# Patient Record
Sex: MALE | Race: WHITE | NOT HISPANIC OR LATINO | URBAN - METROPOLITAN AREA
[De-identification: names, ages, dates, MRNs, and addresses within clinical notes are randomized per-mention and may not be internally consistent; named-entity substitution may affect disease eponyms.]

---

## 2021-07-28 ENCOUNTER — TELEPHONE ENCOUNTER (OUTPATIENT)
Dept: URBAN - METROPOLITAN AREA CLINIC 52 | Facility: CLINIC | Age: 56
End: 2021-07-28

## 2021-09-02 ENCOUNTER — WEB ENCOUNTER (OUTPATIENT)
Dept: URBAN - METROPOLITAN AREA CLINIC 94 | Facility: CLINIC | Age: 56
End: 2021-09-02

## 2021-09-02 ENCOUNTER — OFFICE VISIT (OUTPATIENT)
Dept: URBAN - METROPOLITAN AREA CLINIC 94 | Facility: CLINIC | Age: 56
End: 2021-09-02
Payer: COMMERCIAL

## 2021-09-02 VITALS
HEIGHT: 73 IN | WEIGHT: 249 LBS | DIASTOLIC BLOOD PRESSURE: 74 MMHG | BODY MASS INDEX: 33 KG/M2 | TEMPERATURE: 97.3 F | SYSTOLIC BLOOD PRESSURE: 120 MMHG | HEART RATE: 76 BPM

## 2021-09-02 DIAGNOSIS — R19.7 DIARRHEA, UNSPECIFIED TYPE: ICD-10-CM

## 2021-09-02 DIAGNOSIS — I85.10 SECONDARY ESOPHAGEAL VARICES WITHOUT BLEEDING: ICD-10-CM

## 2021-09-02 DIAGNOSIS — K70.31 ALCOHOLIC CIRRHOSIS OF LIVER WITH ASCITES: ICD-10-CM

## 2021-09-02 PROBLEM — 420054005: Status: ACTIVE | Noted: 2021-09-02

## 2021-09-02 PROBLEM — 14223005: Status: ACTIVE | Noted: 2021-09-02

## 2021-09-02 PROCEDURE — 99203 OFFICE O/P NEW LOW 30 MIN: CPT | Performed by: INTERNAL MEDICINE

## 2021-09-02 RX ORDER — NADOLOL 20 MG/1
AS DIRECTED TABLET ORAL
Status: ACTIVE | COMMUNITY

## 2021-09-02 RX ORDER — LOPERAMIDE HYDROCHLORIDE 2 MG/1
1 CAPSULE AS NEEDED CAPSULE ORAL
Qty: 60 | Refills: 11 | OUTPATIENT
Start: 2021-09-02 | End: 2022-08-28

## 2021-09-02 RX ORDER — NADOLOL 20 MG/1
2 TABLETS TABLET ORAL ONCE A DAY
Qty: 60 TABLET | Refills: 11 | OUTPATIENT
Start: 2021-09-02

## 2021-09-02 RX ORDER — SPIRONOLACTONE 100 MG/1
1 TABLET TABLET, FILM COATED ORAL ONCE A DAY
Qty: 30 | Refills: 11 | OUTPATIENT
Start: 2021-09-02 | End: 2022-08-28

## 2021-09-02 RX ORDER — SPIRONOLACTONE 100 MG/1
1 TABLET TABLET, FILM COATED ORAL ONCE A DAY
Status: ACTIVE | COMMUNITY

## 2021-09-02 NOTE — HPI-TODAY'S VISIT:
S-Night sweats X 6 mos. No consistent abd pain Still has diarrhea.Oily + greasy stool.1-2 BM/d.Sl loose. Loperamide helps. Exac-? No HX pancreatitis. History of ETOH cirrhosis. No current ETOH. 10/'50-GFB-Dnmob varices, banded X 3.  4/'2015-HPL-Khmxr varices, banded X 5 Colonoscopy-IH.Mild mucosal congestion. BX Duod + colon-NL
MD Office

## 2021-09-03 LAB
A/G RATIO: 1.8
ALBUMIN: 4.5
ALKALINE PHOSPHATASE: 115
ALT (SGPT): 20
AST (SGOT): 30
BASO (ABSOLUTE): 0
BASOS: 1
BILIRUBIN, TOTAL: 0.9
BUN/CREATININE RATIO: 12
BUN: 10
C-REACTIVE PROTEIN, QUANT: 5
CALCIUM: 9.7
CARBON DIOXIDE, TOTAL: 23
CHLORIDE: 98
CREATININE: 0.86
EGFR IF AFRICN AM: 111
EGFR IF NONAFRICN AM: 96
EOS (ABSOLUTE): 0.1
EOS: 2
FREE THYROXINE INDEX: 2.4
GLOBULIN, TOTAL: 2.5
GLUCOSE: 92
HEMATOCRIT: 48
HEMATOLOGY COMMENTS:: (no result)
HEMOGLOBIN: 16.5
IMMATURE CELLS: (no result)
IMMATURE GRANS (ABS): 0
IMMATURE GRANULOCYTES: 0
INR: 1.1
LYMPHS (ABSOLUTE): 1.6
LYMPHS: 24
MCH: 34.1
MCHC: 34.4
MCV: 99
MONOCYTES(ABSOLUTE): 0.9
MONOCYTES: 14
NEUTROPHILS (ABSOLUTE): 3.9
NEUTROPHILS: 59
NRBC: (no result)
PLATELETS: 75
POTASSIUM: 4.4
PROTEIN, TOTAL: 7
PROTHROMBIN TIME: 11.4
RBC: 4.84
RDW: 12.3
SODIUM: 138
T3 UPTAKE: 31
THYROXINE (T4): 7.6
WBC: 6.5

## 2021-09-07 ENCOUNTER — TELEPHONE ENCOUNTER (OUTPATIENT)
Dept: URBAN - METROPOLITAN AREA CLINIC 94 | Facility: CLINIC | Age: 56
End: 2021-09-07

## 2021-09-13 ENCOUNTER — TELEPHONE ENCOUNTER (OUTPATIENT)
Dept: URBAN - METROPOLITAN AREA CLINIC 92 | Facility: CLINIC | Age: 56
End: 2021-09-13

## 2021-09-16 ENCOUNTER — ERX REFILL RESPONSE (OUTPATIENT)
Dept: URBAN - METROPOLITAN AREA CLINIC 94 | Facility: CLINIC | Age: 56
End: 2021-09-16

## 2021-09-16 RX ORDER — NADOLOL 20 MG/1
TAKE 2 TABLETS BY MOUTH DAILY TABLET ORAL
Qty: 180 TABLET | Refills: 4 | OUTPATIENT

## 2021-09-16 RX ORDER — NADOLOL 20 MG/1
2 TABLETS TABLET ORAL ONCE A DAY
Qty: 60 TABLET | Refills: 11 | OUTPATIENT

## 2021-09-24 ENCOUNTER — LAB OUTSIDE AN ENCOUNTER (OUTPATIENT)
Dept: URBAN - METROPOLITAN AREA CLINIC 94 | Facility: CLINIC | Age: 56
End: 2021-09-24

## 2021-09-30 LAB
CALPROTECTIN, STOOL - QDX: (no result)
PANCREATICELASTASE ELISA, STOOL: (no result)

## 2021-10-12 ENCOUNTER — TELEPHONE ENCOUNTER (OUTPATIENT)
Dept: URBAN - METROPOLITAN AREA CLINIC 52 | Facility: CLINIC | Age: 56
End: 2021-10-12

## 2021-10-12 ENCOUNTER — TELEPHONE ENCOUNTER (OUTPATIENT)
Dept: URBAN - METROPOLITAN AREA CLINIC 94 | Facility: CLINIC | Age: 56
End: 2021-10-12

## 2021-10-19 ENCOUNTER — TELEPHONE ENCOUNTER (OUTPATIENT)
Dept: URBAN - METROPOLITAN AREA CLINIC 92 | Facility: CLINIC | Age: 56
End: 2021-10-19

## 2021-11-11 ENCOUNTER — OFFICE VISIT (OUTPATIENT)
Dept: URBAN - METROPOLITAN AREA CLINIC 94 | Facility: CLINIC | Age: 56
End: 2021-11-11

## 2022-02-03 ENCOUNTER — OFFICE VISIT (OUTPATIENT)
Dept: URBAN - METROPOLITAN AREA CLINIC 94 | Facility: CLINIC | Age: 57
End: 2022-02-03
Payer: COMMERCIAL

## 2022-02-03 ENCOUNTER — LAB OUTSIDE AN ENCOUNTER (OUTPATIENT)
Dept: URBAN - METROPOLITAN AREA CLINIC 94 | Facility: CLINIC | Age: 57
End: 2022-02-03

## 2022-02-03 ENCOUNTER — WEB ENCOUNTER (OUTPATIENT)
Dept: URBAN - METROPOLITAN AREA CLINIC 94 | Facility: CLINIC | Age: 57
End: 2022-02-03

## 2022-02-03 VITALS
SYSTOLIC BLOOD PRESSURE: 93 MMHG | HEIGHT: 73 IN | DIASTOLIC BLOOD PRESSURE: 61 MMHG | WEIGHT: 239 LBS | TEMPERATURE: 97.7 F | HEART RATE: 60 BPM | BODY MASS INDEX: 31.68 KG/M2

## 2022-02-03 DIAGNOSIS — R19.7 DIARRHEA, UNSPECIFIED TYPE: ICD-10-CM

## 2022-02-03 DIAGNOSIS — I85.00 ESOPHAGEAL VARICES WITHOUT BLEEDING, UNSPECIFIED ESOPHAGEAL VARICES TYPE: ICD-10-CM

## 2022-02-03 DIAGNOSIS — K74.69 OTHER CIRRHOSIS OF LIVER: ICD-10-CM

## 2022-02-03 DIAGNOSIS — R41.0 CONFUSION: ICD-10-CM

## 2022-02-03 PROCEDURE — 99203 OFFICE O/P NEW LOW 30 MIN: CPT | Performed by: INTERNAL MEDICINE

## 2022-02-03 RX ORDER — NITROGLYCERIN 0.4 MG/1
AS DIRECTED TABLET SUBLINGUAL
Status: ACTIVE | COMMUNITY

## 2022-02-03 RX ORDER — TRAZODONE HYDROCHLORIDE 50 MG/1
1 TABLET AT BEDTIME AS NEEDED TABLET, FILM COATED ORAL ONCE A DAY
Status: ACTIVE | COMMUNITY

## 2022-02-03 RX ORDER — DULOXETINE HYDROCHLORIDE 60 MG/1
CAPSULE, DELAYED RELEASE PELLETS ORAL
Qty: 30 | Status: ACTIVE | COMMUNITY

## 2022-02-03 RX ORDER — RISPERIDONE 2 MG/1
TABLET ORAL
Qty: 60 | Status: ACTIVE | COMMUNITY

## 2022-02-03 RX ORDER — SPIRONOLACTONE 100 MG/1
1 TABLET TABLET, FILM COATED ORAL ONCE A DAY
Status: ON HOLD | COMMUNITY

## 2022-02-03 RX ORDER — BENZTROPINE MESYLATE 1 MG/1
TABLET ORAL
Qty: 30 | Status: ACTIVE | COMMUNITY

## 2022-02-03 RX ORDER — LOPERAMIDE HYDROCHLORIDE 2 MG/1
1 CAPSULE AS NEEDED CAPSULE ORAL
Qty: 60 | Refills: 11 | Status: ON HOLD | COMMUNITY
Start: 2021-09-02 | End: 2022-08-28

## 2022-02-03 RX ORDER — ROSUVASTATIN CALCIUM 5 MG/1
1 TABLET TABLET, FILM COATED ORAL ONCE A DAY
Status: ON HOLD | COMMUNITY

## 2022-02-03 RX ORDER — QUETIAPINE FUMARATE 100 MG/1
TABLET, FILM COATED ORAL
Qty: 30 | Status: ACTIVE | COMMUNITY

## 2022-02-03 RX ORDER — CITALOPRAM HYDROBROMIDE 40 MG/1
1 TABLET TABLET, FILM COATED ORAL ONCE A DAY
Status: ON HOLD | COMMUNITY

## 2022-02-03 RX ORDER — ATORVASTATIN CALCIUM, FILM COATED 10 MG/1
TABLET ORAL
Qty: 30 | Status: ACTIVE | COMMUNITY

## 2022-02-03 RX ORDER — NADOLOL 20 MG/1
1 TABLET ORALLY ONCE A DAY TABLET ORAL
Refills: 4 | Status: ACTIVE | COMMUNITY

## 2022-02-03 RX ORDER — SPIRONOLACTONE 100 MG/1
1 TABLET TABLET, FILM COATED ORAL ONCE A DAY
Qty: 30 | Refills: 11 | Status: ACTIVE | COMMUNITY
Start: 2021-09-02 | End: 2022-08-28

## 2022-02-03 RX ORDER — ASPIRIN 81 MG/1
1 TABLET TABLET, COATED ORAL ONCE A DAY
Refills: 0 | Status: ON HOLD | COMMUNITY
Start: 1900-01-01

## 2022-02-03 NOTE — HPI-TODAY'S VISIT:
S-No abd pain. Some confusion. Was DX as manic depressive. Irregular BB. C alt with D.Oily + greasy stool. 1-2 BM/d.Sl loose. Loperamide helps. Exac-? No HX pancreatitis. History of ETOH cirrhosis. No current ETOH. 10/'52-AYO-Muwav varices, banded X 3.  4/'2002-MJF-Qlwbk varices, banded X 5 Colonoscopy-IH.Mild mucosal congestion. BX Duod + colon-NL [id number]. [place]

## 2022-02-23 ENCOUNTER — TELEPHONE ENCOUNTER (OUTPATIENT)
Dept: URBAN - METROPOLITAN AREA CLINIC 92 | Facility: CLINIC | Age: 57
End: 2022-02-23

## 2022-02-24 ENCOUNTER — OFFICE VISIT (OUTPATIENT)
Dept: URBAN - METROPOLITAN AREA MEDICAL CENTER 34 | Facility: MEDICAL CENTER | Age: 57
End: 2022-02-24

## 2022-03-03 ENCOUNTER — OFFICE VISIT (OUTPATIENT)
Dept: URBAN - METROPOLITAN AREA CLINIC 94 | Facility: CLINIC | Age: 57
End: 2022-03-03
Payer: COMMERCIAL

## 2022-03-03 VITALS
BODY MASS INDEX: 33.93 KG/M2 | DIASTOLIC BLOOD PRESSURE: 79 MMHG | WEIGHT: 256 LBS | HEART RATE: 56 BPM | TEMPERATURE: 98.1 F | HEIGHT: 73 IN | SYSTOLIC BLOOD PRESSURE: 126 MMHG

## 2022-03-03 DIAGNOSIS — D69.6 THROMBOCYTOPENIA: ICD-10-CM

## 2022-03-03 DIAGNOSIS — R19.7 DIARRHEA, UNSPECIFIED TYPE: ICD-10-CM

## 2022-03-03 DIAGNOSIS — I85.00 ESOPHAGEAL VARICES WITHOUT BLEEDING, UNSPECIFIED ESOPHAGEAL VARICES TYPE: ICD-10-CM

## 2022-03-03 DIAGNOSIS — K74.69 OTHER CIRRHOSIS OF LIVER: ICD-10-CM

## 2022-03-03 PROCEDURE — 99213 OFFICE O/P EST LOW 20 MIN: CPT | Performed by: INTERNAL MEDICINE

## 2022-03-03 RX ORDER — NITROGLYCERIN 0.4 MG/1
AS DIRECTED TABLET SUBLINGUAL
Status: ACTIVE | COMMUNITY

## 2022-03-03 RX ORDER — CITALOPRAM HYDROBROMIDE 40 MG/1
1 TABLET TABLET, FILM COATED ORAL ONCE A DAY
Status: ON HOLD | COMMUNITY

## 2022-03-03 RX ORDER — ASPIRIN 81 MG/1
1 TABLET TABLET, COATED ORAL ONCE A DAY
Refills: 0 | Status: ON HOLD | COMMUNITY
Start: 1900-01-01

## 2022-03-03 RX ORDER — ROSUVASTATIN CALCIUM 5 MG/1
1 TABLET TABLET, FILM COATED ORAL ONCE A DAY
Status: ON HOLD | COMMUNITY

## 2022-03-03 RX ORDER — NADOLOL 20 MG/1
1 TABLET ORALLY ONCE A DAY TABLET ORAL
Refills: 4 | Status: ACTIVE | COMMUNITY

## 2022-03-03 RX ORDER — SPIRONOLACTONE 100 MG/1
1 TABLET TABLET, FILM COATED ORAL ONCE A DAY
Status: ON HOLD | COMMUNITY

## 2022-03-03 RX ORDER — SPIRONOLACTONE 100 MG/1
1 TABLET TABLET, FILM COATED ORAL ONCE A DAY
Qty: 30 | Refills: 11 | Status: ACTIVE | COMMUNITY
Start: 2021-09-02 | End: 2022-08-28

## 2022-03-03 RX ORDER — BENZTROPINE MESYLATE 1 MG/1
TABLET ORAL
Qty: 30 | Status: ACTIVE | COMMUNITY

## 2022-03-03 RX ORDER — ATORVASTATIN CALCIUM, FILM COATED 10 MG/1
TABLET ORAL
Qty: 30 | Status: ACTIVE | COMMUNITY

## 2022-03-03 RX ORDER — DULOXETINE HYDROCHLORIDE 60 MG/1
CAPSULE, DELAYED RELEASE PELLETS ORAL
Qty: 30 | Status: ACTIVE | COMMUNITY

## 2022-03-03 RX ORDER — LOPERAMIDE HYDROCHLORIDE 2 MG/1
1 CAPSULE AS NEEDED CAPSULE ORAL
Qty: 60 | Refills: 11 | Status: ON HOLD | COMMUNITY
Start: 2021-09-02 | End: 2022-08-28

## 2022-03-03 RX ORDER — TRAZODONE HYDROCHLORIDE 50 MG/1
1 TABLET AT BEDTIME AS NEEDED TABLET, FILM COATED ORAL ONCE A DAY
Status: ACTIVE | COMMUNITY

## 2022-03-03 RX ORDER — QUETIAPINE FUMARATE 100 MG/1
TABLET, FILM COATED ORAL
Qty: 30 | Status: ACTIVE | COMMUNITY

## 2022-03-03 RX ORDER — RISPERIDONE 2 MG/1
TABLET ORAL
Qty: 60 | Status: ACTIVE | COMMUNITY

## 2022-03-03 NOTE — HPI-TODAY'S VISIT:
EGD was canceled due to Platelets 55K. Prior one was 80K No ETOH or new meds except penicillin for tooth.Still on it. PRIOR:-No abd pain. Some confusion. Was DX as manic depressive. Irregular BB. C alt with D.Oily + greasy stool. 1-2 BM/d.Sl loose. Loperamide helps. Exac-? No HX pancreatitis. History of ETOH cirrhosis. No current ETOH. 10/'87-MIV-Ypgbo varices, banded X 3.  4/'2061-WLR-Ewish varices, banded X 5 Colonoscopy-IH.Mild mucosal congestion. BX Duod + colon-NL [id number]. [place]

## 2022-03-04 LAB
A/G RATIO: 1.9
ALBUMIN: 4.4
ALKALINE PHOSPHATASE: 87
ALT (SGPT): 13
AMMONIA, PLASMA: 73
AST (SGOT): 23
BASO (ABSOLUTE): 0
BASOS: 0
BILIRUBIN, TOTAL: 0.9
BUN/CREATININE RATIO: 11
BUN: 9
CALCIUM: 9.2
CARBON DIOXIDE, TOTAL: 24
CHLORIDE: 101
CREATININE: 0.85
EGFR: 101
EOS (ABSOLUTE): 0.1
EOS: 1
GLOBULIN, TOTAL: 2.3
GLUCOSE: 83
HEMATOCRIT: 43.1
HEMATOLOGY COMMENTS:: (no result)
HEMOGLOBIN: 14.4
IMMATURE CELLS: (no result)
IMMATURE GRANS (ABS): (no result)
IMMATURE GRANULOCYTES: (no result)
INR: 1.1
LYMPHS (ABSOLUTE): 1.1
LYMPHS: 20
MCH: 32.2
MCHC: 33.4
MCV: 96
MONOCYTES(ABSOLUTE): 0.8
MONOCYTES: 16
NEUTROPHILS (ABSOLUTE): 3.3
NEUTROPHILS: 63
NRBC: (no result)
PLATELETS: 60
POTASSIUM: 4.6
PROTEIN, TOTAL: 6.7
PROTHROMBIN TIME: 11.6
RBC: 4.47
RDW: 13
SODIUM: 137
WBC: 5.3

## 2022-03-14 ENCOUNTER — TELEPHONE ENCOUNTER (OUTPATIENT)
Dept: URBAN - METROPOLITAN AREA CLINIC 52 | Facility: CLINIC | Age: 57
End: 2022-03-14

## 2022-05-05 ENCOUNTER — OFFICE VISIT (OUTPATIENT)
Dept: URBAN - METROPOLITAN AREA CLINIC 94 | Facility: CLINIC | Age: 57
End: 2022-05-05

## 2022-05-05 RX ORDER — SPIRONOLACTONE 100 MG/1
1 TABLET TABLET, FILM COATED ORAL ONCE A DAY
COMMUNITY

## 2022-05-05 RX ORDER — SPIRONOLACTONE 100 MG/1
1 TABLET TABLET, FILM COATED ORAL ONCE A DAY
Qty: 30 | Refills: 11 | Status: ON HOLD | COMMUNITY
Start: 2021-09-02 | End: 2022-08-28

## 2022-05-05 RX ORDER — RISPERIDONE 2 MG/1
TABLET ORAL
Qty: 60 | Status: ON HOLD | COMMUNITY

## 2022-05-05 RX ORDER — ASPIRIN 81 MG/1
1 TABLET TABLET, COATED ORAL ONCE A DAY
Refills: 0 | COMMUNITY
Start: 1900-01-01

## 2022-05-05 RX ORDER — ROSUVASTATIN CALCIUM 5 MG/1
1 TABLET TABLET, FILM COATED ORAL ONCE A DAY
COMMUNITY

## 2022-05-05 RX ORDER — DULOXETINE HYDROCHLORIDE 60 MG/1
CAPSULE, DELAYED RELEASE PELLETS ORAL
Qty: 30 | Status: ON HOLD | COMMUNITY

## 2022-05-05 RX ORDER — TRAZODONE HYDROCHLORIDE 50 MG/1
1 TABLET AT BEDTIME AS NEEDED TABLET, FILM COATED ORAL ONCE A DAY
Status: ON HOLD | COMMUNITY

## 2022-05-05 RX ORDER — CITALOPRAM HYDROBROMIDE 40 MG/1
1 TABLET TABLET, FILM COATED ORAL ONCE A DAY
COMMUNITY

## 2022-05-05 RX ORDER — QUETIAPINE FUMARATE 100 MG/1
TABLET, FILM COATED ORAL
Qty: 30 | Status: ON HOLD | COMMUNITY

## 2022-05-05 RX ORDER — BENZTROPINE MESYLATE 1 MG/1
TABLET ORAL
Qty: 30 | Status: ON HOLD | COMMUNITY

## 2022-05-05 RX ORDER — LOPERAMIDE HYDROCHLORIDE 2 MG/1
1 CAPSULE AS NEEDED CAPSULE ORAL
Qty: 60 | Refills: 11 | COMMUNITY
Start: 2021-09-02 | End: 2022-08-28

## 2022-05-05 RX ORDER — ATORVASTATIN CALCIUM, FILM COATED 10 MG/1
TABLET ORAL
Qty: 30 | Status: ON HOLD | COMMUNITY

## 2022-05-05 RX ORDER — NITROGLYCERIN 0.4 MG/1
AS DIRECTED TABLET SUBLINGUAL
Status: ON HOLD | COMMUNITY

## 2022-05-05 RX ORDER — NADOLOL 20 MG/1
1 TABLET ORALLY ONCE A DAY TABLET ORAL
Refills: 4 | Status: ON HOLD | COMMUNITY

## 2022-05-26 ENCOUNTER — OFFICE VISIT (OUTPATIENT)
Dept: URBAN - METROPOLITAN AREA CLINIC 94 | Facility: CLINIC | Age: 57
End: 2022-05-26
Payer: COMMERCIAL

## 2022-05-26 VITALS
HEIGHT: 73 IN | TEMPERATURE: 97.1 F | SYSTOLIC BLOOD PRESSURE: 110 MMHG | HEART RATE: 64 BPM | BODY MASS INDEX: 38.7 KG/M2 | DIASTOLIC BLOOD PRESSURE: 70 MMHG | WEIGHT: 292 LBS

## 2022-05-26 DIAGNOSIS — N39.41 URGE INCONTINENCE OF URINE: ICD-10-CM

## 2022-05-26 DIAGNOSIS — R19.7 DIARRHEA, UNSPECIFIED TYPE: ICD-10-CM

## 2022-05-26 DIAGNOSIS — K74.69 OTHER CIRRHOSIS OF LIVER: ICD-10-CM

## 2022-05-26 DIAGNOSIS — D69.6 THROMBOCYTOPENIA: ICD-10-CM

## 2022-05-26 DIAGNOSIS — I85.00 ESOPHAGEAL VARICES WITHOUT BLEEDING, UNSPECIFIED ESOPHAGEAL VARICES TYPE: ICD-10-CM

## 2022-05-26 PROBLEM — 19943007: Status: ACTIVE | Noted: 2022-02-03

## 2022-05-26 PROBLEM — 87557004: Status: ACTIVE | Noted: 2022-05-26

## 2022-05-26 PROBLEM — 14223005: Status: ACTIVE | Noted: 2022-02-03

## 2022-05-26 PROCEDURE — 99213 OFFICE O/P EST LOW 20 MIN: CPT | Performed by: INTERNAL MEDICINE

## 2022-05-26 RX ORDER — SPIRONOLACTONE 100 MG/1
1 TABLET TABLET, FILM COATED ORAL ONCE A DAY
Qty: 30 | Refills: 11 | Status: ACTIVE | COMMUNITY
Start: 2021-09-02 | End: 2022-08-28

## 2022-05-26 RX ORDER — RISPERIDONE 2 MG/1
TABLET ORAL
Qty: 60 | Status: ACTIVE | COMMUNITY

## 2022-05-26 RX ORDER — NADOLOL 20 MG/1
1 TABLET ORALLY ONCE A DAY TABLET ORAL
Refills: 4 | Status: ACTIVE | COMMUNITY

## 2022-05-26 RX ORDER — DULOXETINE HYDROCHLORIDE 60 MG/1
CAPSULE, DELAYED RELEASE PELLETS ORAL
Qty: 30 | Status: ACTIVE | COMMUNITY

## 2022-05-26 RX ORDER — TRAZODONE HYDROCHLORIDE 50 MG/1
1 TABLET AT BEDTIME AS NEEDED TABLET, FILM COATED ORAL ONCE A DAY
Status: ACTIVE | COMMUNITY

## 2022-05-26 RX ORDER — NITROGLYCERIN 0.4 MG/1
AS DIRECTED TABLET SUBLINGUAL
Status: ACTIVE | COMMUNITY

## 2022-05-26 RX ORDER — ATORVASTATIN CALCIUM, FILM COATED 10 MG/1
TABLET ORAL
Qty: 30 | Status: ACTIVE | COMMUNITY

## 2022-05-26 RX ORDER — QUETIAPINE FUMARATE 100 MG/1
TABLET, FILM COATED ORAL
Qty: 30 | Status: ACTIVE | COMMUNITY

## 2022-05-26 RX ORDER — BENZTROPINE MESYLATE 1 MG/1
TABLET ORAL
Qty: 30 | Status: ACTIVE | COMMUNITY

## 2022-05-26 NOTE — HPI-TODAY'S VISIT:
This week-Blood in stool, BR. Faint Nausea + fatigue Urinary incontinence (occ). No dysuria EGD was canceled due to Platelets 55K. Prior one was 80K No ETOH or new meds except penicillin for tooth.Still on it. PRIOR:-No abd pain. Some confusion. Was DX as manic depressive. Irregular BB. C alt with D.Oily + greasy stool. 1-2 BM/d.Sl loose. Loperamide helps. Exac-? No HX pancreatitis. History of ETOH cirrhosis. No current ETOH. 10/'80-GUB-Kwodj varices, banded X 3.  4/'2090-GLS-Ycaiv varices, banded X 5 Colonoscopy-IH.Mild mucosal congestion. BX Duod + colon-NL [id number]. [place] Recent labs-NL LTs. Hgb 14.4. Plats-40K. NH-NL

## 2022-05-27 LAB
A/G RATIO: 1.5
ALBUMIN: 4.3
ALKALINE PHOSPHATASE: 131
ALT (SGPT): 18
APPEARANCE: CLEAR
AST (SGOT): 38
BASO (ABSOLUTE): 0
BASOS: 1
BILIRUBIN, TOTAL: 0.7
BILIRUBIN: NEGATIVE
BUN/CREATININE RATIO: 11
BUN: 8
CALCIUM: 8.9
CARBON DIOXIDE, TOTAL: 21
CHLORIDE: 100
CREATININE: 0.74
EGFR: 105
EOS (ABSOLUTE): 0.2
EOS: 3
GLOBULIN, TOTAL: 2.8
GLUCOSE: 104
GLUCOSE: NEGATIVE
HEMATOCRIT: 45.3
HEMATOLOGY COMMENTS:: (no result)
HEMOGLOBIN: 15.2
IMMATURE CELLS: (no result)
IMMATURE GRANS (ABS): 0
IMMATURE GRANULOCYTES: 1
KETONES: NEGATIVE
LYMPHS (ABSOLUTE): 1.7
LYMPHS: 26
MCH: 32.5
MCHC: 33.6
MCV: 97
MICROSCOPIC EXAMINATION: (no result)
MONOCYTES(ABSOLUTE): 0.8
MONOCYTES: 13
NEUTROPHILS (ABSOLUTE): 3.7
NEUTROPHILS: 56
NITRITE, URINE: NEGATIVE
NRBC: (no result)
OCCULT BLOOD: NEGATIVE
PH: 6
PLATELETS: 54
POTASSIUM: 4.3
PROTEIN, TOTAL: 7.1
PROTEIN: NEGATIVE
RBC: 4.67
RDW: 12.8
SODIUM: 136
SPECIFIC GRAVITY: 1.01
URINE-COLOR: YELLOW
UROBILINOGEN,SEMI-QN: 0.2
WBC ESTERASE: NEGATIVE
WBC: 6.4

## 2022-07-28 ENCOUNTER — OFFICE VISIT (OUTPATIENT)
Dept: URBAN - METROPOLITAN AREA CLINIC 94 | Facility: CLINIC | Age: 57
End: 2022-07-28

## 2022-07-28 RX ORDER — NITROGLYCERIN 0.4 MG/1
AS DIRECTED TABLET SUBLINGUAL
Status: ACTIVE | COMMUNITY

## 2022-07-28 RX ORDER — SPIRONOLACTONE 100 MG/1
1 TABLET TABLET, FILM COATED ORAL ONCE A DAY
Qty: 30 | Refills: 11 | Status: ACTIVE | COMMUNITY
Start: 2021-09-02 | End: 2022-08-28

## 2022-07-28 RX ORDER — BENZTROPINE MESYLATE 1 MG/1
TABLET ORAL
Qty: 30 | Status: ACTIVE | COMMUNITY

## 2022-07-28 RX ORDER — QUETIAPINE FUMARATE 100 MG/1
TABLET, FILM COATED ORAL
Qty: 30 | Status: ACTIVE | COMMUNITY

## 2022-07-28 RX ORDER — DULOXETINE HYDROCHLORIDE 60 MG/1
CAPSULE, DELAYED RELEASE PELLETS ORAL
Qty: 30 | Status: ACTIVE | COMMUNITY

## 2022-07-28 RX ORDER — TRAZODONE HYDROCHLORIDE 50 MG/1
1 TABLET AT BEDTIME AS NEEDED TABLET, FILM COATED ORAL ONCE A DAY
Status: ACTIVE | COMMUNITY

## 2022-07-28 RX ORDER — NADOLOL 20 MG/1
1 TABLET ORALLY ONCE A DAY TABLET ORAL
Refills: 4 | Status: ACTIVE | COMMUNITY

## 2022-07-28 RX ORDER — RISPERIDONE 2 MG/1
TABLET ORAL
Qty: 60 | Status: ACTIVE | COMMUNITY

## 2022-07-28 RX ORDER — ATORVASTATIN CALCIUM, FILM COATED 10 MG/1
TABLET ORAL
Qty: 30 | Status: ACTIVE | COMMUNITY

## 2022-08-12 ENCOUNTER — TELEPHONE ENCOUNTER (OUTPATIENT)
Dept: URBAN - METROPOLITAN AREA CLINIC 92 | Facility: CLINIC | Age: 57
End: 2022-08-12

## 2022-08-15 ENCOUNTER — TELEPHONE ENCOUNTER (OUTPATIENT)
Dept: URBAN - METROPOLITAN AREA CLINIC 92 | Facility: CLINIC | Age: 57
End: 2022-08-15

## 2022-08-15 PROBLEM — 415116008: Status: ACTIVE | Noted: 2022-03-03

## 2022-09-01 ENCOUNTER — TELEPHONE ENCOUNTER (OUTPATIENT)
Dept: URBAN - METROPOLITAN AREA CLINIC 92 | Facility: CLINIC | Age: 57
End: 2022-09-01

## 2022-09-29 ENCOUNTER — TELEPHONE ENCOUNTER (OUTPATIENT)
Dept: URBAN - METROPOLITAN AREA CLINIC 94 | Facility: CLINIC | Age: 57
End: 2022-09-29

## 2023-01-01 ENCOUNTER — OUT OF OFFICE VISIT (OUTPATIENT)
Dept: URBAN - METROPOLITAN AREA MEDICAL CENTER 34 | Facility: MEDICAL CENTER | Age: 58
End: 2023-01-01

## 2023-01-01 ENCOUNTER — LAB OUTSIDE AN ENCOUNTER (OUTPATIENT)
Dept: URBAN - METROPOLITAN AREA CLINIC 94 | Facility: CLINIC | Age: 58
End: 2023-01-01

## 2023-01-01 ENCOUNTER — P2P PATIENT RECORD (OUTPATIENT)
Age: 58
End: 2023-01-01

## 2023-01-01 ENCOUNTER — TELEPHONE ENCOUNTER (OUTPATIENT)
Dept: URBAN - METROPOLITAN AREA CLINIC 94 | Facility: CLINIC | Age: 58
End: 2023-01-01

## 2023-01-01 ENCOUNTER — DASHBOARD ENCOUNTERS (OUTPATIENT)
Age: 58
End: 2023-01-01

## 2023-01-01 ENCOUNTER — OFFICE VISIT (OUTPATIENT)
Dept: URBAN - METROPOLITAN AREA CLINIC 94 | Facility: CLINIC | Age: 58
End: 2023-01-01

## 2023-01-01 ENCOUNTER — OFFICE VISIT (OUTPATIENT)
Dept: URBAN - METROPOLITAN AREA CLINIC 94 | Facility: CLINIC | Age: 58
End: 2023-01-01
Payer: COMMERCIAL

## 2023-01-01 ENCOUNTER — CLAIMS CREATED FROM THE CLAIM WINDOW (OUTPATIENT)
Dept: URBAN - METROPOLITAN AREA MEDICAL CENTER 34 | Facility: MEDICAL CENTER | Age: 58
End: 2023-01-01
Payer: COMMERCIAL

## 2023-01-01 ENCOUNTER — WEB ENCOUNTER (OUTPATIENT)
Dept: URBAN - METROPOLITAN AREA CLINIC 94 | Facility: CLINIC | Age: 58
End: 2023-01-01

## 2023-01-01 VITALS
HEART RATE: 60 BPM | TEMPERATURE: 97.3 F | DIASTOLIC BLOOD PRESSURE: 76 MMHG | SYSTOLIC BLOOD PRESSURE: 124 MMHG | BODY MASS INDEX: 36.84 KG/M2 | HEIGHT: 73 IN | WEIGHT: 278 LBS

## 2023-01-01 VITALS
HEIGHT: 73 IN | DIASTOLIC BLOOD PRESSURE: 73 MMHG | SYSTOLIC BLOOD PRESSURE: 111 MMHG | BODY MASS INDEX: 32.87 KG/M2 | HEART RATE: 64 BPM | WEIGHT: 248 LBS | TEMPERATURE: 97 F

## 2023-01-01 VITALS
HEART RATE: 60 BPM | HEIGHT: 73 IN | BODY MASS INDEX: 33.4 KG/M2 | SYSTOLIC BLOOD PRESSURE: 114 MMHG | TEMPERATURE: 97.3 F | WEIGHT: 252 LBS | DIASTOLIC BLOOD PRESSURE: 75 MMHG

## 2023-01-01 DIAGNOSIS — K70.30 ALC (ALCOHOLIC LIVER CIRRHOSIS): ICD-10-CM

## 2023-01-01 DIAGNOSIS — K55.069 SUPERIOR MESENTERIC ARTERY THROMBOSIS: ICD-10-CM

## 2023-01-01 DIAGNOSIS — R93.3 ABN FINDINGS-GI TRACT: ICD-10-CM

## 2023-01-01 DIAGNOSIS — R93.5 ABNORMAL COMPUTED TOMOGRAPHY OF ABDOMEN AND PELVIS: ICD-10-CM

## 2023-01-01 DIAGNOSIS — R19.7 ACUTE DIARRHEA: ICD-10-CM

## 2023-01-01 DIAGNOSIS — E87.6 HYPOKALEMIA: ICD-10-CM

## 2023-01-01 DIAGNOSIS — K55.069 SUPERIOR MESENTERIC VEIN THROMBOSIS: ICD-10-CM

## 2023-01-01 DIAGNOSIS — K55.059 SUPERIOR MESENTERIC VEIN THROMBOSIS: ICD-10-CM

## 2023-01-01 DIAGNOSIS — Z87.19 H/O ESOPHAGEAL VARICES: ICD-10-CM

## 2023-01-01 DIAGNOSIS — R16.0 LIVER MASS: ICD-10-CM

## 2023-01-01 DIAGNOSIS — Z12.11 COLON CANCER SCREENING: ICD-10-CM

## 2023-01-01 DIAGNOSIS — K70.31 ALCOHOLIC CIRRHOSIS OF LIVER WITH ASCITES: ICD-10-CM

## 2023-01-01 DIAGNOSIS — R10.84 GENERALIZED ABDOMINAL PAIN: ICD-10-CM

## 2023-01-01 LAB
A/G RATIO: 1.3
AFP, SERUM, TUMOR MARKER: 3.7
ALBUMIN: 3.5
ALKALINE PHOSPHATASE: 113
ALT (SGPT): 13
AST (SGOT): 26
BILIRUBIN, TOTAL: 2.2
BUN/CREATININE RATIO: 20
BUN/CREATININE RATIO: 29
BUN: 13
CALCIUM: 9
CALCIUM: 9
CARBON DIOXIDE, TOTAL: 23
CARBON DIOXIDE: 23
CHLORIDE: 91
CHLORIDE: 96
CREATININE: 0.65
CREATININE: 1.36
EGFR: 109
EGFR: 60
GLOBULIN, TOTAL: 2.6
GLUCOSE: 113
GLUCOSE: 166
HEMATOCRIT: 45.7
HEMOGLOBIN: 15.6
MCH: 31.7
MCHC: 34.1
MCV: 92.9
MPV: 11.3
PLATELET COUNT: 113
POTASSIUM: 3.4
POTASSIUM: 5.1
PROTEIN, TOTAL: 6.1
RDW: 12.5
RED BLOOD CELL COUNT: 4.92
SODIUM: 126
SODIUM: 132
UREA NITROGEN (BUN): 39
WHITE BLOOD CELL COUNT: 8.1

## 2023-01-01 PROCEDURE — 99233 SBSQ HOSP IP/OBS HIGH 50: CPT | Performed by: INTERNAL MEDICINE

## 2023-01-01 PROCEDURE — G8427 DOCREV CUR MEDS BY ELIG CLIN: HCPCS | Performed by: INTERNAL MEDICINE

## 2023-01-01 PROCEDURE — 99215 OFFICE O/P EST HI 40 MIN: CPT | Performed by: INTERNAL MEDICINE

## 2023-01-01 PROCEDURE — 99222 1ST HOSP IP/OBS MODERATE 55: CPT | Performed by: INTERNAL MEDICINE

## 2023-01-01 PROCEDURE — 99245 OFF/OP CONSLTJ NEW/EST HI 55: CPT | Performed by: INTERNAL MEDICINE

## 2023-01-01 RX ORDER — DULOXETINE HYDROCHLORIDE 60 MG/1
CAPSULE, DELAYED RELEASE PELLETS ORAL
Qty: 30 | Status: ACTIVE | COMMUNITY

## 2023-01-01 RX ORDER — SPIRONOLACTONE 100 MG/1
TABLET ORAL
Qty: 90 | Status: ACTIVE | COMMUNITY

## 2023-01-01 RX ORDER — QUETIAPINE FUMARATE 100 MG/1
TABLET, FILM COATED ORAL
Qty: 30 | Status: DISCONTINUED | COMMUNITY

## 2023-01-01 RX ORDER — FUROSEMIDE 20 MG/1
1 TABLET TABLET ORAL ONCE A DAY
Status: ACTIVE | COMMUNITY

## 2023-01-01 RX ORDER — ATORVASTATIN CALCIUM, FILM COATED 10 MG/1
TABLET ORAL
Qty: 30 | Status: ACTIVE | COMMUNITY

## 2023-01-01 RX ORDER — FUROSEMIDE 20 MG/1
1 TABLET TABLET ORAL ONCE A DAY
Qty: 90 TABLET | Refills: 2

## 2023-01-01 RX ORDER — NADOLOL 20 MG/1
1 TABLET ORALLY ONCE A DAY TABLET ORAL
Refills: 4 | Status: ACTIVE | COMMUNITY

## 2023-01-01 RX ORDER — NITROGLYCERIN 0.4 MG/1
AS DIRECTED TABLET SUBLINGUAL
Status: ACTIVE | COMMUNITY

## 2023-01-01 RX ORDER — POLYETHYLENE GLYCOL-3350 AND ELECTROLYTES WITH FLAVOR PACK 240; 5.84; 2.98; 6.72; 22.72 G/278.26G; G/278.26G; G/278.26G; G/278.26G; G/278.26G
AS DIRECTED POWDER, FOR SOLUTION ORAL ONCE
Qty: 1 | Refills: 0 | OUTPATIENT
Start: 2023-01-01 | End: 2023-01-01

## 2023-01-01 RX ORDER — APIXABAN 5 MG/1
1 TABLET TABLET, FILM COATED ORAL TWICE A DAY
Status: ACTIVE | COMMUNITY

## 2023-05-09 NOTE — HPI-TODAY'S VISIT:
59 y/o M with hx of alcoholic cirrhosis c/b varices(s/p banding, last EGD 2020) and thrombocytopenia here for urgent appointment for abdominal pain  Patient reports a couple of days ago started to have significant abdominal pain with some nausea. Went to ER where WBC 13, PLT 80. CT a/p with new superior mesenteric vein thrombosis(compared to CT 09/2022) with distal ileal and cecum wall thickening/fat stranding(also known cirrhosis, mild ascites). Was discharged. Today reports similar pain.  Denies any vomiting, rectal bleeding

## 2023-05-09 NOTE — PHYSICAL EXAM GASTROINTESTINAL
Abdomen , soft, generalized tenderness in abdomen to palpation, no guarding, no masses palpable, increased bowel sounds , Liver and Spleen , no hepatomegaly present , no hepatosplenomegaly , liver nontender , spleen not palpable

## 2023-08-22 NOTE — HPI-TODAY'S VISIT:
57 y/o M with hx of alcoholic cirrhosis c/b varices(s/p banding) and thrombocytopenia, acute SMV thrombus(apixaban), CAD s/p stent here for for f/u  Patient with abdominal pain this May and found to have acute superior mesenteric vein thrombis with distal ileal and cecum wall thickening/fat stranding(also known cirrhosis, mild ascites), for which admitted [facility]. Did undergo EGD with banding prior to initation of . Now on apixaban, with most recent CT a/p showing resolution of prior thrombus, though did notice some new liver lesions for which MRI Liver pending. Did undergo paracentesis of 8.5L on 08/07. Is on lasix 20mg, spironolactone 100mg.   Today still reports abdominal pain along with distension. Is on pain meds from pain management    Last labs normal in May other than PLT 39.  CT a/p 08/2023: previous SMV thrombus resolved, small hypodense liver lesions with largest 1.2cm, not seen well on prior exams(Recommend MRI liver) EGD 05/2023: 2 columns esophageal varices s/p 5 bands, mild pHG

## 2023-09-12 NOTE — HPI-TODAY'S VISIT:
57 y/o M with hx of alcoholic cirrhosis c/b varices(s/p banding) and thrombocytopenia, acute SMV thrombus(apixaban), CAD s/p stent here for for f/u, now with liver lesion  Patient with abdominal pain this May 2023 and found to have acute superior mesenteric vein thrombis with distal ileal and cecum wall thickening/fat stranding(also known cirrhosis, mild ascites), for which admitted to [facility]. Did undergo EGD with banding prior to initation of . Now on apixaban, with most recent CT a/p showing resolution of prior thrombus, though did notice some new liver lesions. MRI Liver with hepatic metastatic disease with AFP normal with question of cecal thickening. Last paracentesis of 6.6L on 08/22 with no SBP but atypical cells present on cytology. Is on lasix 20mg, spironolactone 100mg.   Today reports having recurrent ascites. Last K low at 3.4. Na 134 Is on pain meds from pain management. Awaiting liver biopsy    MRI a/p 08/2023: cirrhosis, hepatic metastatic disease, moderate ascites,  masslike thickening of the distal ileum and cecum at the ileocecal junction(could be from prior SMV thrombus) CT a/p 08/2023: previous SMV thrombus resolved, small hypodense liver lesions with largest 1.2cm, not seen well on prior exams(Recommend MRI liver) EGD 05/2023: 2 columns esophageal varices s/p 5 bands, mild pHG

## 2023-10-25 ENCOUNTER — OFFICE VISIT (OUTPATIENT)
Dept: URBAN - METROPOLITAN AREA CLINIC 94 | Facility: CLINIC | Age: 58
End: 2023-10-25